# Patient Record
Sex: FEMALE | Race: BLACK OR AFRICAN AMERICAN | Employment: FULL TIME | URBAN - METROPOLITAN AREA
[De-identification: names, ages, dates, MRNs, and addresses within clinical notes are randomized per-mention and may not be internally consistent; named-entity substitution may affect disease eponyms.]

---

## 2020-11-13 ENCOUNTER — VIRTUAL VISIT (OUTPATIENT)
Dept: FAMILY MEDICINE | Facility: CLINIC | Age: 20
End: 2020-11-13

## 2020-11-13 DIAGNOSIS — Z86.19 HISTORY OF CHLAMYDIA INFECTION: Primary | ICD-10-CM

## 2020-11-13 PROCEDURE — 99203 OFFICE O/P NEW LOW 30 MIN: CPT | Mod: 95 | Performed by: PHYSICIAN ASSISTANT

## 2020-11-13 RX ORDER — AZITHROMYCIN 500 MG/1
1000 TABLET, FILM COATED ORAL DAILY
Qty: 2 TABLET | Refills: 0 | Status: SHIPPED | OUTPATIENT
Start: 2020-11-13 | End: 2020-11-14

## 2020-11-13 NOTE — PROGRESS NOTES
"Nathalie Gonzáles is a 20 year old female who is being evaluated via a billable telephone visit.      The patient has been notified of following:     \"This telephone visit will be conducted via a call between you and your physician/provider. We have found that certain health care needs can be provided without the need for a physical exam.  This service lets us provide the care you need with a short phone conversation.  If a prescription is necessary we can send it directly to your pharmacy.  If lab work is needed we can place an order for that and you can then stop by our lab to have the test done at a later time.    Telephone visits are billed at different rates depending on your insurance coverage. During this emergency period, for some insurers they may be billed the same as an in-person visit.  Please reach out to your insurance provider with any questions.    If during the course of the call the physician/provider feels a telephone visit is not appropriate, you will not be charged for this service.\"    Patient has given verbal consent for Telephone visit?  Yes    What phone number would you like to be contacted at?     How would you like to obtain your AVS? Mail a copy    Subjective     Nathalie Gonzáles is a 20 year old female who presents via phone visit today for the following health issues:      I had a checkup 2-3months ago and I had chlamydia - took the pills ahtat she sent me and supposed to go back for other tests for other sexually transmitted diseases and never followed up (in reviewing chart seen by [facility] for annual exam 4 months ago and positive chlamydia and negative gonorrhea)   \"Weird in that area again\".    I don't think I got all the results.   Yellow vaginal discharge.  Kind of pain both sides of both legs  I stopped talking to partner and has not had intercourse since diagnosed with chlamydia- reports she was a virgin prior to seeing him .   No abdominal pain.  I already have a lot " "of problems with period .  Goes to emergency department  a lot for periods  \"Go to hospital I dont' have a lot of liquid in body and give me pills for nausea\"  Not on oral contraceptive pill   Scared of birth control  Not sexually active currently   No pain with urination, no vomiting or diarrhea.     HPI                Review of Systems   Constitutional, HEENT, cardiovascular, pulmonary, gi and gu systems are negative, except as otherwise noted.       Objective          Vitals:  No vitals were obtained today due to virtual visit.    healthy, alert and no distress  PSYCH: Alert and oriented times 3; coherent speech, normal   rate and volume, able to articulate logical thoughts, able   to abstract reason, no tangential thoughts, no hallucinations   or delusions  Her affect is normal and pleasant  RESP: No cough, no audible wheezing, able to talk in full sentences  Remainder of exam unable to be completed due to telephone visits            Assessment/Plan:    Assessment & Plan     History of chlamydia infection  Will treat with second round of zithromax.  Advised that there is a nationwide shortage of testing supplies for chlamydia and gonorrhea.  Has upcoming appointment with Tish Jefferson next week- encouraged to keep that appointment and have testing for other sexually transmitted disease and that time  Warning signs and symptoms warranting emergency department evaluation reviewed.   - azithromycin (ZITHROMAX) 500 MG tablet  Dispense: 2 tablet; Refill: 0          Patient Instructions   Take azithromycin as prescribed for one dose  Keep appointment with Tish Jefferson as scheduled next week.  Your appointment is at 1120 AM on 11/19/2020   Go to the urgent care or emergency department if develop abdominal pain, fever, or other change in symptoms.       Return in about 6 days (around 11/19/2020), or if symptoms worsen or fail to improve, for in person.    Susana Cross PA-C  [facility]    Phone call duration:  16 minutes

## 2020-11-13 NOTE — PATIENT INSTRUCTIONS
Take azithromycin as prescribed for one dose  Keep appointment with Tish Jefferson as scheduled next week.  Your appointment is at 1120 AM on 11/19/2020   Go to the urgent care or emergency department if develop abdominal pain, fever, or other change in symptoms.

## 2020-11-19 ENCOUNTER — OFFICE VISIT (OUTPATIENT)
Dept: FAMILY MEDICINE | Facility: CLINIC | Age: 20
End: 2020-11-19

## 2020-11-19 VITALS
TEMPERATURE: 98.3 F | HEART RATE: 112 BPM | OXYGEN SATURATION: 98 % | SYSTOLIC BLOOD PRESSURE: 130 MMHG | BODY MASS INDEX: 31.09 KG/M2 | WEIGHT: 170 LBS | DIASTOLIC BLOOD PRESSURE: 83 MMHG

## 2020-11-19 DIAGNOSIS — Z11.3 SCREENING FOR STDS (SEXUALLY TRANSMITTED DISEASES): ICD-10-CM

## 2020-11-19 DIAGNOSIS — B37.31 YEAST INFECTION OF THE VAGINA: Primary | ICD-10-CM

## 2020-11-19 PROCEDURE — 87389 HIV-1 AG W/HIV-1&-2 AB AG IA: CPT | Performed by: FAMILY MEDICINE

## 2020-11-19 PROCEDURE — 99213 OFFICE O/P EST LOW 20 MIN: CPT | Performed by: FAMILY MEDICINE

## 2020-11-19 PROCEDURE — 36415 COLL VENOUS BLD VENIPUNCTURE: CPT | Performed by: FAMILY MEDICINE

## 2020-11-19 RX ORDER — NYSTATIN 100000 U/G
OINTMENT TOPICAL 2 TIMES DAILY
Qty: 60 G | Refills: 0 | Status: SHIPPED | OUTPATIENT
Start: 2020-11-19 | End: 2020-12-28

## 2020-11-19 NOTE — PROGRESS NOTES
Subjective     Nathalie Gonzáles is a 20 year old female who presents to clinic today for the following health issues:    HPI         Concern - STD  Onset: 3 months ago  Description: Patient following-up with Chlamydia treatment- told to get retested in 3 months. Had a single intercourse only and contracted chlamydia.   Intensity: mild  Progression of Symptoms:  improving  Accompanying Signs & Symptoms: some irritation of perineum  Previous history of similar problem: none  Precipitating factors:        Worsened by: none  Alleviating factors:        Improved by: none  Therapies tried and outcome: single dose of azithromycin        Review of Systems   Constitutional, HEENT, cardiovascular, pulmonary, gi and gu systems are negative, except as otherwise noted.      Objective    /83 (BP Location: Left arm, Patient Position: Chair, Cuff Size: Adult Large)   Pulse 112   Temp 98.3  F (36.8  C) (Oral)   Wt 77.1 kg (170 lb)   SpO2 98%   BMI 31.09 kg/m    Body mass index is 31.09 kg/m .  Physical Exam   GENERAL APPEARANCE: healthy, alert and no distress  EYES: Eyes grossly normal to inspection, PERRL and conjunctivae and sclerae normal  HENT: ear canals and TM's normal, nose and mouth without ulcers or lesions, oropharynx clear and oral mucous membranes moist  NECK: no adenopathy, no asymmetry, masses, or scars and thyroid normal to palpation  RESP: lungs clear to auscultation - no rales, rhonchi or wheezes  CV: regular rates and rhythm, normal S1 S2, no S3 or S4, no murmur, click or rub, no peripheral edema and peripheral pulses strong  ABDOMEN: soft, nontender, no hepatosplenomegaly, no masses and bowel sounds normal  MS: no musculoskeletal defects are noted and gait is age appropriate without ataxia  SKIN: no suspicious lesions or rashes  NEURO: Normal strength and tone, sensory exam grossly normal, mentation intact and speech normal  PSYCH: mentation appears normal and affect normal/bright   GYN: External  genitalia normal with no rash or lesions except some irritation.             Assessment & Plan     Screening for STDs (sexually transmitted diseases)  Single contact and is not at risk for reinfection. Fully treated for chlamydia. Will not retest due to shortage of swabs. Requests test for HIV  - HIV Antigen Antibody Combo    Yeast infection of the vagina  Outside irritation and will use nystatin ointment twice a day as needed.   - nystatin (MYCOSTATIN) 088452 UNIT/GM external ointment  Dispense: 60 g; Refill: 0          FURTHER TESTING:       - Defer Pap due to patient's discomfort with exam.   FUTURE APPOINTMENTS:       - Follow-up for annual visit or as needed  See Patient Instructions    No follow-ups on file.    Tessa Tabor MD  [facility]

## 2020-11-20 LAB — HIV 1+2 AB+HIV1 P24 AG SERPL QL IA: NONREACTIVE

## 2020-11-20 NOTE — PATIENT INSTRUCTIONS
Patient Education     Preventing Vaginitis     Use mild, unscented soap when you bathe or shower to avoid irritating your vagina.    Vaginitis is irritation or infection of the vagina or the outside opening of it (vulva). Vaginitis can be caused by bacteria, viruses, parasites, or yeast. Chemicals such as in perfumes or soaps or in spermicides can sometimes be a cause. Vaginitis can be caused by hormone changes in pregnancy or with menopause. You can help prevent vaginitis. Follow the tips below. And see your healthcare provider if you have any symptoms.   Hygiene    Stay away from chemicals. Don't use vaginal sprays. Don't use scented toilet paper or tampons that are scented. Sprays and scents have chemicals that can irritate your vagina.    Don't douche unless you are told to by your healthcare provider. Douching is rarely needed. And it upsets the normal balance in the vagina.    Wash yourself well. Wash the outer vaginal area (vulva) every day with mild, unscented soap. Keep it as dry as possible.    Wipe correctly. Make sure to wipe from front to back after a bowel movement. This helps keep from spreading bacteria from your anus to your vagina.    Change your tampon often. During your period, make sure to change your tampon as often as directed on the package. This allows the normal flow of vaginal discharge and blood.    Lifestyle    Limit your number of sexual partners. The more partners you have, the greater your risk of infection. Using condoms helps reduce your risk.    Get enough sleep. Sleep helps keep your body s immune system healthy. This helps you fight infection.    Lose weight, if needed. Excess weight can reduce air circulation around your vagina. This can increase your risk of infection.    Exercise regularly. Regular activity helps keep your body healthy.    Take antibiotics only as directed. Antibiotics can change the normal chemical balance in the vagina.      Clothing    Don t sit in wet  clothes. Yeast thrives when it s warm and damp.    Don t wear tight pants. And don t wear tights, leggings, or hose without a cotton crotch. These types of clothing trap warmth and moisture.    Wear cotton underwear. Cotton lets air circulate around the vagina.    Symptoms of vaginitis    Irritation, swelling, or itching of the genital area    Vaginal discharge    Bad vaginal odor    Pain or burning during urination    Diane last reviewed this educational content on 11/1/2019 2000-2020 [employer] [address] [place] [address]. All rights reserved. This information is not intended as a substitute for professional medical care. Always follow your healthcare professional's instructions.

## 2020-11-22 NOTE — RESULT ENCOUNTER NOTE
Dear Nathalie Gonzáles,    Your test results are attached. I am happy to let you know that they are stable.    The screen for infection was negative or normal.     Please call me if you have any questions about these test results or about your care.    Sincerely,    Tessa Tabor MD

## 2020-12-28 ENCOUNTER — VIRTUAL VISIT (OUTPATIENT)
Dept: FAMILY MEDICINE | Facility: CLINIC | Age: 20
End: 2020-12-28

## 2020-12-28 ENCOUNTER — TELEPHONE (OUTPATIENT)
Dept: FAMILY MEDICINE | Facility: CLINIC | Age: 20
End: 2020-12-28

## 2020-12-28 DIAGNOSIS — R10.84 ABDOMINAL PAIN, GENERALIZED: ICD-10-CM

## 2020-12-28 DIAGNOSIS — R11.2 INTRACTABLE VOMITING WITH NAUSEA, UNSPECIFIED VOMITING TYPE: Primary | ICD-10-CM

## 2020-12-28 PROCEDURE — 99207 PR NO CHARGE LOS: CPT | Mod: TEL | Performed by: INTERNAL MEDICINE

## 2020-12-28 NOTE — PROGRESS NOTES
"Nathalie Gonzáles is a 20 year old female who is being evaluated via a billable telephone visit.      The patient has been notified of following:     \"This telephone visit will be conducted via a call between you and your physician/provider. We have found that certain health care needs can be provided without the need for a physical exam.  This service lets us provide the care you need with a short phone conversation.  If a prescription is necessary we can send it directly to your pharmacy.  If lab work is needed we can place an order for that and you can then stop by our lab to have the test done at a later time.    Telephone visits are billed at different rates depending on your insurance coverage. During this emergency period, for some insurers they may be billed the same as an in-person visit.  Please reach out to your insurance provider with any questions.    If during the course of the call the physician/provider feels a telephone visit is not appropriate, you will not be charged for this service.\"    Patient has given verbal consent for Telephone visit?  Yes    What phone number would you like to be contacted at? [phone]    How would you like to obtain your AVS? Mail a copy    Subjective     Nathalie Gonzáles is a 20 year old female who presents via phone visit today for the following health issues:    HPI   Went to ER the day after [place] and she was having intractable nausea and vomiting  This happens normally for her when she is having her menstrual cycle  Was evaluated in the ER and was given some Zofran ODT and IV fluids  She felt better after that  She was discharged home  She came home and slept when she woke up she has been having again nausea and vomiting  Intractable nausea and vomiting  Generalized abdominal pain  Not able to keep anything down  She drank some wine which she normally does not do and she is wondering if that has any role to play  She has done with her menstrual cycle but still " having nausea vomiting  She is in distress            Constitutional, HEENT, cardiovascular, pulmonary, gi and gu systems are negative, except as otherwise noted.       Objective          Vitals:  No vitals were obtained today due to virtual visit.    alert and severe distress  PSYCH: Alert and oriented times 3; coherent speech, normal   rate and volume, able to articulate logical thoughts, able   to abstract reason, no tangential thoughts, no hallucinations   or delusions  Her affect is tearful  RESP: No cough, no audible wheezing, able to talk in full sentences  Remainder of exam unable to be completed due to telephone visits            Assessment/Plan:    Assessment & Plan     Intractable vomiting with nausea, unspecified vomiting type  Intractable nausea and vomiting  Cause remains unclear  Apparently she gets this whenever she has menstrual periods but she has done with her cycle now and still discontinuing  She went to the ER on the 26th  I have reviewed reports from there  I did not see any blood work but she had a hCG and urine analysis done  These were negative  She drank some wine after coming back from the ER and after that the nausea and vomiting started getting worse again  She is in distress  She has generalized abdominal pain  We need to examine her in the clinic to see if this is simple gastritis as she is on ibuprofen or anything else is going on  We also need to check her vitals  I have explained this to her  We will try and get an appointment for today    Abdominal pain, generalized  Her volume status will need to be assessed in the clinic along with vitals taken and if there is any doubt we will have to send her to the ER      I have checked with the clinic  There are no appointments today  I have spoken to her again on the phone at 8:50 AM and inform her that she can go to [facility] urgent care  She does not need the appointment for this            Return in about 1 day (around  12/29/2020).    Ricardo Shepherd MD  [facility]    Phone call duration:  10 minutes

## 2020-12-28 NOTE — TELEPHONE ENCOUNTER
S-(situation): Patient has had nausea and vomiting that started again after her ED visit on 12/26/20. This is typical for her when she has her period. Her period ended yesterday and last night she had more vomiting and sharp pains in her stomach.     B-(background): Has nausea and vomiting with her period. Had a referral to OB-GYN at one time; does not look current and may need that placed again.     A-(assessment): Patient denies fever, trauma to area. This is common for patient.     R-(recommendations): Be seen. Patient scheduled for telephone visit.           Hussain Moctezuma RN, BSN, PHN

## 2021-07-10 ENCOUNTER — NURSE TRIAGE (OUTPATIENT)
Dept: NURSING | Facility: CLINIC | Age: 21
End: 2021-07-10

## 2021-07-10 NOTE — TELEPHONE ENCOUNTER
Patient calling reporting if she can take Ibuprofen with her Nausea medication Zofran. RN informed patient can take both medication and there is no medication interaction. Caller verbalized understanding. Denies further questions.      Marcos Washington RN  [facility]

## 2021-07-10 NOTE — TELEPHONE ENCOUNTER
Gets nauseated with her menses/ started her period yesterday/ did take motrin on an empty stomach this morning/cautioned to always take it with some food or milk gets so nauseated she starts to vomit/ is trying to be proactive and not get to that point/ has not voided since 11AM last night/ encouraged to start taking liquids gradually and slowly eat some dry toast and /or crackers/ calling to get some zofran  so she can go to work at 2PM/ will azalia to the MD on call when available.  JERICA Corrales  MD page at 9:05am/Dr Jim Dietrich /repage at 9:45/call back at 10:15AM/ Dr Dietrich ordered zofran 4 mg ODT every 8 hours as needed 10 tablets.  Called into [facility] at [phone] [facility] at [address] [facility]/ called patient to notify  Colton Arenas RN [facility] [phone]    Reason for Disposition    Nausea is a chronic symptom (recurrent or ongoing AND present > 4 weeks)    Additional Information    Negative: Shock suspected (e.g., cold/pale/clammy skin, too weak to stand, low BP, rapid pulse)    Negative: Sounds like a life-threatening emergency to the triager    Negative: [1] Nausea or vomiting AND [2] pregnancy < 20 weeks    Negative: Menstrual Period - Missed or Late (i.e., pregnancy suspected)    Negative: Heat exhaustion suspected (i.e., dehydration from heat exposure)    Negative: Motion sickness suspected (i.e., nausea with car, plane, boat, or train travel)    Negative: Anxiety or stress suspected (i.e., nausea with anxiety attacks or stressful situations)    Negative: Traumatic Brain Injury (TBI) suspected    Negative: Nausea (or Vomiting) in a cancer patient who is currently (or recently) receiving chemotherapy or radiation therapy, or cancer patient who has metastatic or end-stage cancer and is receiving palliative care    Negative: Vomiting occurs    Negative: Other symptom is present, see that guideline.  (e.g., chest pain, headache, dizziness, abdominal pain, colds, sore throat, etc.).    Negative: Unable to walk, or  can only walk with assistance (e.g., requires support)    Negative: Difficulty breathing    Negative: [1] Insulin-dependent diabetes (Type I) AND [2] glucose > 400 mg/dl (22 mmol/l)    Negative: [1] Drinking very little AND [2] dehydration suspected (e.g., no urine > 12 hours, very dry mouth, very lightheaded)    Negative: Patient sounds very sick or weak to the triager    Negative: Fever > 104 F (40 C)    Negative: [1] Fever > 101 F (38.3 C) AND [2] age > 60    Negative: [1] Fever > 100.0 F (37.8 C) AND [2] bedridden (e.g., nursing home patient, CVA, chronic illness, recovering from surgery)    Negative: [1] Fever > 100.0 F (37.8 C) AND [2] diabetes mellitus or weak immune system (e.g., HIV positive, cancer chemo, splenectomy, organ transplant, chronic steroids)    Negative: Taking any of the following medications: digoxin (Lanoxin), lithium, theophylline, phenytoin (Dilantin)    Negative: Yellowish color of the skin or white of the eye (i.e., jaundice)    Negative: Fever present > 3 days (72 hours)    Negative: Receiving cancer chemotherapy medication    Negative: Taking prescription medication that could cause nausea (e.g., narcotics/opiates, antibiotics, OCPs, many others)    Negative: Nausea lasts > 1 week    Negative: Alcohol or drug abuse, known or suspected    Protocols used: NAUSEA-A-

## 2022-08-29 ENCOUNTER — TELEPHONE (OUTPATIENT)
Dept: FAMILY MEDICINE | Facility: CLINIC | Age: 22
End: 2022-08-29

## 2022-08-29 NOTE — TELEPHONE ENCOUNTER
Patient called to report she needs Rx for Nausea medication. States she was in the hospital 2 weeks ago but went out of town and was unable to  Rx. I did assist her with scheduling virtual visit with Tish Jefferson and transferred her to our RN line for triage. Patient wanting me to send message to provider asking for Zofran Rx while she waits for virtual visit. Will route to provider to advise.

## 2022-09-01 NOTE — TELEPHONE ENCOUNTER
Pt transferred to triage RN.     Pt states she was seen last month by [facility] provider for her nausea. Not able to locate any appointment in pt's chart. Upon further questioning by this writer, pt states she was seen with [facility], states she has a provider she sees there.     Advised pt would need a primary care provider appointment to discuss concerns of nausea that happens frequently during her menstrual cycle.    Pt states she will contact primary care provider at [facility].  Leticia Chambers RN  [facility]

## 2022-09-08 ENCOUNTER — VIRTUAL VISIT (OUTPATIENT)
Dept: FAMILY MEDICINE | Facility: CLINIC | Age: 22
End: 2022-09-08

## 2022-09-08 DIAGNOSIS — N94.6 DYSMENORRHEA: Primary | ICD-10-CM

## 2022-09-08 DIAGNOSIS — N89.8 VAGINAL DISCHARGE: ICD-10-CM

## 2022-09-08 DIAGNOSIS — Z11.59 NEED FOR HEPATITIS C SCREENING TEST: ICD-10-CM

## 2022-09-08 DIAGNOSIS — Z11.3 SCREENING FOR STDS (SEXUALLY TRANSMITTED DISEASES): ICD-10-CM

## 2022-09-08 PROCEDURE — 99214 OFFICE O/P EST MOD 30 MIN: CPT | Mod: 95 | Performed by: NURSE PRACTITIONER

## 2022-09-08 RX ORDER — ONDANSETRON 4 MG/1
4 TABLET, ORALLY DISINTEGRATING ORAL EVERY 8 HOURS PRN
Qty: 30 TABLET | Refills: 0 | Status: SHIPPED | OUTPATIENT
Start: 2022-09-08

## 2022-09-08 NOTE — PROGRESS NOTES
Nathalie is a 22 year old who is being evaluated via a billable telephone visit.      What phone number would you like to be contacted at? [phone]  How would you like to obtain your AVS? Mail a copy    Assessment & Plan     Dysmenorrhea  Advised her to take Ibuprofen starting 3 days prior to her menses to help with pain.  - REVIEW OF HEALTH MAINTENANCE PROTOCOL ORDERS  - ondansetron (ZOFRAN ODT) 4 MG ODT tab  Dispense: 30 tablet; Refill: 0    Vaginal discharge  Checking for STI's  - NEISSERIA GONORRHOEA PCR  - CHLAMYDIA TRACHOMATIS PCR  - Wet prep - lab collect  - HIV Antigen Antibody Combo  - Hepatitis C Screen Reflex to HCV RNA Quant and Genotype  - Hepatitis B surface antigen  - UA Macro with Reflex to Micro and Culture - lab collect  - HCG qualitative urine    Screening for STDs (sexually transmitted diseases)  See above. Always use condoms  - NEISSERIA GONORRHOEA PCR  - CHLAMYDIA TRACHOMATIS PCR  - Wet prep - lab collect  - HIV Antigen Antibody Combo  - Hepatitis C Screen Reflex to HCV RNA Quant and Genotype  - Hepatitis B surface antigen    Need for hepatitis C screening test    - Hepatitis C Screen Reflex to HCV RNA Quant and Genotype    Due for OV for pap      Ordering of each unique test  Prescription drug management  30 minutes spent on the date of the encounter doing chart review, history and exam, documentation and further activities per the note       Regular exercise  See Patient Instructions    Return in about 6 weeks (around 10/20/2022), or if symptoms worsen or fail to improve, for Routine preventive.    CARRINGTON Jurado CNP  [facility]    Subjective   Nathalie is a 22 year old{  presenting for the following health issues:  Nausea      HPI     Concern - Nausea  Onset: Been years  Description: Most days she feels nausea or pain. Occurs when on menstrual cycle.  Intensity: severe  Progression of Symptoms:  same and waxing and waning  Accompanying Signs & Symptoms:  N/A  Previous history of similar problem: No.  Precipitating factors:        Worsened by: Eating certain foods.  Alleviating factors:        Improved by: No eating.  Therapies tried and outcome: None      Review of Systems   Constitutional, HEENT, cardiovascular, pulmonary, gi and gu systems are negative, except as otherwise noted.      Objective           Vitals:  No vitals were obtained today due to virtual visit.    Physical Exam   healthy, alert and no distress  PSYCH: Alert and oriented times 3; coherent speech, normal   rate and volume, able to articulate logical thoughts, able   to abstract reason, no tangential thoughts, no hallucinations   or delusions  Her affect is normal and pleasant  RESP: No cough, no audible wheezing, able to talk in full sentences  Remainder of exam unable to be completed due to telephone visits    No results found for any visits on 09/08/22.        Phone call duration: 40  minutes

## 2023-01-14 ENCOUNTER — HEALTH MAINTENANCE LETTER (OUTPATIENT)
Age: 23
End: 2023-01-14

## 2024-02-11 ENCOUNTER — HEALTH MAINTENANCE LETTER (OUTPATIENT)
Age: 24
End: 2024-02-11

## 2025-03-08 ENCOUNTER — HEALTH MAINTENANCE LETTER (OUTPATIENT)
Age: 25
End: 2025-03-08